# Patient Record
Sex: MALE | Race: WHITE | NOT HISPANIC OR LATINO | URBAN - METROPOLITAN AREA
[De-identification: names, ages, dates, MRNs, and addresses within clinical notes are randomized per-mention and may not be internally consistent; named-entity substitution may affect disease eponyms.]

---

## 2021-02-19 ENCOUNTER — EMERGENCY (EMERGENCY)
Facility: HOSPITAL | Age: 61
LOS: 1 days | Discharge: HOME | End: 2021-02-19
Attending: EMERGENCY MEDICINE
Payer: COMMERCIAL

## 2021-02-19 VITALS
RESPIRATION RATE: 17 BRPM | SYSTOLIC BLOOD PRESSURE: 144 MMHG | HEART RATE: 77 BPM | TEMPERATURE: 98 F | WEIGHT: 164.91 LBS | OXYGEN SATURATION: 100 % | DIASTOLIC BLOOD PRESSURE: 88 MMHG | HEIGHT: 68 IN

## 2021-02-19 VITALS
HEART RATE: 72 BPM | RESPIRATION RATE: 18 BRPM | SYSTOLIC BLOOD PRESSURE: 138 MMHG | DIASTOLIC BLOOD PRESSURE: 75 MMHG | OXYGEN SATURATION: 99 %

## 2021-02-19 PROCEDURE — 99283 EMERGENCY DEPT VISIT LOW MDM: CPT

## 2021-02-19 PROCEDURE — 73564 X-RAY EXAM KNEE 4 OR MORE: CPT

## 2021-02-19 PROCEDURE — 73564 X-RAY EXAM KNEE 4 OR MORE: CPT | Mod: 26,RT

## 2021-02-19 PROCEDURE — 99283 EMERGENCY DEPT VISIT LOW MDM: CPT | Mod: 25

## 2021-02-19 NOTE — ED PROVIDER NOTE - CLINICAL SUMMARY MEDICAL DECISION MAKING FREE TEXT BOX
Frannie: Patient previous cipro had minor twisting injury and had knee swell up. some small amount of pain. Knee is cool no redness. Not infectious. + tendon injury as most likely injury. fuid is in the joint. will xary and splint and refer. On asa. might hold asa for a day or 2.

## 2021-02-19 NOTE — ED PROVIDER NOTE - NSFOLLOWUPINSTRUCTIONS_ED_ALL_ED_FT
Rest and stay well hydrated.  No fracture or dislocation seen on x-ray today.   Follow-up with Orthopedic Surgery or Sports Medicine - you will likely need MRI to further evaluate your knee.   Wear knee immobilizer until seen by orthopedics.   Use crutches to walk, weight bear as tolerated.   Return to ED for any worsening pain, fever, redness, bleeding or any new concerning symptoms. Rest and stay well hydrated.  No fracture or dislocation seen on x-ray today-  you have a knee effusion (fluid in the joint space).   Follow-up with Orthopedic Surgery or Sports Medicine - you will likely need MRI to further evaluate your knee.   Wear knee immobilizer until seen by orthopedics.   Use crutches to walk, weight bear as tolerated.   Return to ED for any worsening pain, fever, redness, bleeding or any new concerning symptoms.

## 2021-02-19 NOTE — ED PROVIDER NOTE - PHYSICAL EXAMINATION
Gen: well developed male NAD   HEENT: NCAT, EOMI, no nasal discharge, mucous membranes moist  CV: RRR, +S1/S2, no M/R/G  Resp: CTAB, no W/R/R  GI: Abdomen soft non-distended, NTTP, no masses  MSK: R knee with edema, no warmth/erythema/bony tenderness or quad tenderness AROM intact but limited due to pain. distal pulses 2+ b/l and sensation intact/equal b/l  Neuro: A&Ox4, following commands, moving all four extremities spontaneously  Psych: appropriate mood

## 2021-02-19 NOTE — ED PROVIDER NOTE - NSFOLLOWUPCLINICS_GEN_ALL_ED_FT
An Orthopedic Surgeon  Orthopedic Surgery  .  [place]   Phone:   Fax:   Follow Up Time:     [facility] Orthopedic Surgery  Orthopedic Surgery  [address], 3rd & 4th [place] [address]  Phone: [phone]  Fax:   Follow Up Time:     [facility] - Primary Care  Primary Care  [address] [place] [address]  Phone: [phone]  Fax:   Follow Up Time:     [facility] Sports Medicine  Sports Medicine  [address] [place] [address]  Phone: [phone]  Fax:   Follow Up Time:

## 2021-02-19 NOTE — ED ADULT NURSE NOTE - OBJECTIVE STATEMENT
60 year old male presents to ED via wheel chair through waiting room from home complaining of atraumatic right knee pain and swelling. History of HTN, HLD. Finished three week course of cipro yesterday for "my prostate" as prescribed by his urologist. States he had no issues this morning when he woke up. 60 year old male presents to ED via wheel chair through waiting room from home complaining of atraumatic right knee pain and swelling. History of HTN, HLD. Finished three week course of cipro yesterday for "my prostate" as prescribed by his urologist. States he had no issues this morning when he woke up and then noted some discomfort in his knee, looked and noticed it being swollen. + swelling around knee, mostly above. Also endorsing discomfort behind kneecap. Denies any recent trauma, fall, injury or accident. Pain worse with movement causing difficulty walking due to the increased pain. Most comfortable at rest slightly bent. Placed in position of comfort with blanket under knee.

## 2021-02-19 NOTE — ED PROVIDER NOTE - CARE PROVIDER_API CALL
Pete So)  Orthopaedic Surgery; Surgery of the Hand  [address], [address]  [place] [address]  Phone: [phone]  Fax: [phone]  Follow Up Time: 1-3 Days

## 2021-02-19 NOTE — ED PROVIDER NOTE - OBJECTIVE STATEMENT
61 y/o M PMH aneurysm on full dose aspirin daily, HTN HLD presents to ED c/o atraumatic R knee pain onset this morning after he woke up. pt finished course of cipro for prostatitis yesterday 59 y/o M PMH aneurysm on full dose aspirin daily, HTN HLD presents to ED c/o R knee pain and swelling onset this morning after he woke up / twisted knee no falls or other trauma. pt finished course of cipro for prostatitis yesterday. denies f/c, redness. +has ambulated on knee but painful. denies pain or swelling in other joints. no hx knee surgery or problems in the past.

## 2021-02-19 NOTE — ED PROVIDER NOTE - PATIENT PORTAL LINK FT
You can access the FollowMyHealth Patient Portal offered by [facility] by registering at the following website: [email or web address]. By joining [facility]’s FollowMyHealth portal, you will also be able to view your health information using other applications (apps) compatible with our system.

## 2021-02-22 NOTE — ED PROCEDURE NOTE - ATTENDING CONTRIBUTION TO CARE
I Tony Kathleen MD discussed the procedure with the resident and or ACP and went over risks and benefits as well as indications for the procedure. I was present for key portions of the procedure itself and assisted as needed.

## 2021-11-19 ENCOUNTER — TRANSCRIPTION ENCOUNTER (OUTPATIENT)
Age: 61
End: 2021-11-19

## 2021-11-19 ENCOUNTER — APPOINTMENT (OUTPATIENT)
Dept: GASTROENTEROLOGY | Facility: CLINIC | Age: 61
End: 2021-11-19
Payer: COMMERCIAL

## 2021-11-19 VITALS
HEART RATE: 67 BPM | TEMPERATURE: 96.8 F | OXYGEN SATURATION: 99 % | DIASTOLIC BLOOD PRESSURE: 80 MMHG | SYSTOLIC BLOOD PRESSURE: 124 MMHG | BODY MASS INDEX: 25.01 KG/M2 | HEIGHT: 68 IN | WEIGHT: 165 LBS

## 2021-11-19 DIAGNOSIS — R19.4 CHANGE IN BOWEL HABIT: ICD-10-CM

## 2021-11-19 DIAGNOSIS — M25.00 HEMARTHROSIS, UNSPECIFIED JOINT: ICD-10-CM

## 2021-11-19 DIAGNOSIS — E78.5 HYPERLIPIDEMIA, UNSPECIFIED: ICD-10-CM

## 2021-11-19 DIAGNOSIS — I10 ESSENTIAL (PRIMARY) HYPERTENSION: ICD-10-CM

## 2021-11-19 PROBLEM — I72.9 ANEURYSM OF UNSPECIFIED SITE: Chronic | Status: ACTIVE | Noted: 2021-02-19

## 2021-11-19 PROCEDURE — 82270 OCCULT BLOOD FECES: CPT

## 2021-11-19 PROCEDURE — 99202 OFFICE O/P NEW SF 15 MIN: CPT

## 2021-11-19 RX ORDER — ROSUVASTATIN CALCIUM 5 MG/1
5 TABLET, FILM COATED ORAL
Refills: 0 | Status: ACTIVE | COMMUNITY

## 2021-11-19 RX ORDER — TADALAFIL 5 MG/1
5 TABLET, FILM COATED ORAL
Refills: 0 | Status: ACTIVE | COMMUNITY

## 2021-11-19 RX ORDER — AMLODIPINE BESYLATE 5 MG/1
5 TABLET ORAL
Refills: 0 | Status: ACTIVE | COMMUNITY

## 2021-11-19 RX ORDER — ASPIRIN 325 MG/1
325 TABLET, FILM COATED ORAL
Refills: 0 | Status: ACTIVE | COMMUNITY

## 2021-11-19 NOTE — REVIEW OF SYSTEMS
[As Noted in HPI] : as noted in HPI [Negative] : Respiratory [FreeTextEntry7] : Subtle change in bowel habits with softer stool. [FreeTextEntry9] : Acute swelling of right knee.

## 2021-11-19 NOTE — PHYSICAL EXAM
[General Appearance - Alert] : alert [General Appearance - In No Acute Distress] : in no acute distress [General Appearance - Well Nourished] : well nourished [Respiration, Rhythm And Depth] : normal respiratory rhythm and effort [Auscultation Breath Sounds / Voice Sounds] : lungs were clear to auscultation bilaterally [Apical Impulse] : the apical impulse was normal [Heart Rate And Rhythm] : heart rate was normal and rhythm regular [Heart Sounds] : normal S1 and S2 [Heart Sounds Gallop] : no gallops [Bowel Sounds] : normal bowel sounds [Abdomen Soft] : soft [Abdomen Tenderness] : non-tender [] : no hepato-splenomegaly [Normal Sphincter Tone] : normal sphincter tone [No Rectal Mass] : no rectal mass [Occult Blood Positive] : stool was negative for occult blood [FreeTextEntry1] : Swelling of the right knee visible through the patient's pants.

## 2021-11-19 NOTE — ASSESSMENT
[FreeTextEntry1] : The patient is a 60-year-old male with a history of hypertension and hyperlipidemia.  Patient has no chronic upper GI symptoms and most recently notices a subtle change in his bowel habits with mild increased intestinal gas and a softer stool.  The patient's stool is clearly guaiac negative and he is up-to-date on his colonoscopic examinations.  It is difficult to ascertain as to whether his GI symptoms have anything to do with his Covid booster but it does appear that he is having some mild colon spasm.  He does have an underlying redundant colon.  The patient was told to start a high potency probiotic decrease nonsoluble fiber and take soluble fiber supplements to increase bowel efficiency.  I am hesitant to give him any antispasmodic at the present time due to his prostate enlargement.  The patient will get back to me in 2 weeks time with a progress report.  If the symptoms persist we may consider imaging the abdomen.

## 2021-11-19 NOTE — HISTORY OF PRESENT ILLNESS
[FreeTextEntry1] : I saw your patient Tony Gao in the office today.  The patient is a 60-year-old male with a history of hypertension and hyperlipidemia.  He denies a history of diabetes or coronary artery disease and is on amlodipine and Crestor.  The patient has an enlarged prostate and currently takes Cialis.  The patient received a booster for Covid several weeks ago and thereafter states he noticed some mild change in his bowel habits.  He noticed a sensation of incomplete evacuation with mild increase in his intestinal gas as well as a looser stool.  There was no blood in the stool or on the toilet tissue and no nocturnal bowel movements.  The patient developed swelling of his right knee several days ago went to the emergency room he was found to have hemarthrosis of the right knee and may need further orthopedic intervention.  The patient denies a history of chronic reflux and his appetite is good with no dysphagia or unexplained weight loss.  The patient has had serial colonoscopies and his last was 2 years ago he has a redundant colon and colon spasm.  Patient states he eats a well-balanced diet.

## 2021-12-27 ENCOUNTER — APPOINTMENT (OUTPATIENT)
Dept: CT IMAGING | Facility: CLINIC | Age: 61
End: 2021-12-27

## 2021-12-27 ENCOUNTER — OUTPATIENT (OUTPATIENT)
Dept: OUTPATIENT SERVICES | Facility: HOSPITAL | Age: 61
LOS: 1 days | End: 2021-12-27
Payer: COMMERCIAL

## 2021-12-27 ENCOUNTER — RESULT REVIEW (OUTPATIENT)
Age: 61
End: 2021-12-27

## 2021-12-27 DIAGNOSIS — R19.4 CHANGE IN BOWEL HABIT: ICD-10-CM

## 2021-12-27 PROCEDURE — 82565 ASSAY OF CREATININE: CPT

## 2021-12-27 PROCEDURE — 74177 CT ABD & PELVIS W/CONTRAST: CPT

## 2021-12-27 PROCEDURE — 74177 CT ABD & PELVIS W/CONTRAST: CPT | Mod: 26

## 2022-03-07 ENCOUNTER — RX RENEWAL (OUTPATIENT)
Age: 62
End: 2022-03-07

## 2022-03-07 RX ORDER — LINACLOTIDE 72 UG/1
72 CAPSULE, GELATIN COATED ORAL
Qty: 30 | Refills: 5 | Status: ACTIVE | COMMUNITY
Start: 2022-01-10 | End: 1900-01-01

## 2023-02-04 NOTE — ED ADULT NURSE NOTE - NSSUHOSCREENINGYN_ED_ALL_ED
0700: report received  1200: removed saline lock, answered all questions, went over instructions, escorted out to private vehicle via wheelchair.   No - the patient is unable to be screened due to medical condition

## 2023-07-20 ENCOUNTER — NON-APPOINTMENT (OUTPATIENT)
Age: 63
End: 2023-07-20

## 2024-05-16 ENCOUNTER — NON-APPOINTMENT (OUTPATIENT)
Age: 64
End: 2024-05-16

## 2024-05-23 ENCOUNTER — NON-APPOINTMENT (OUTPATIENT)
Age: 64
End: 2024-05-23

## 2024-08-14 ENCOUNTER — NON-APPOINTMENT (OUTPATIENT)
Age: 64
End: 2024-08-14

## 2024-11-11 ENCOUNTER — APPOINTMENT (OUTPATIENT)
Dept: GASTROENTEROLOGY | Facility: CLINIC | Age: 64
End: 2024-11-11
Payer: COMMERCIAL

## 2024-11-11 VITALS
BODY MASS INDEX: 25.46 KG/M2 | TEMPERATURE: 97.1 F | OXYGEN SATURATION: 98 % | SYSTOLIC BLOOD PRESSURE: 120 MMHG | WEIGHT: 168 LBS | HEART RATE: 84 BPM | HEIGHT: 68 IN | DIASTOLIC BLOOD PRESSURE: 60 MMHG

## 2024-11-11 DIAGNOSIS — I10 ESSENTIAL (PRIMARY) HYPERTENSION: ICD-10-CM

## 2024-11-11 DIAGNOSIS — R19.4 CHANGE IN BOWEL HABIT: ICD-10-CM

## 2024-11-11 DIAGNOSIS — E78.5 HYPERLIPIDEMIA, UNSPECIFIED: ICD-10-CM

## 2024-11-11 DIAGNOSIS — Z86.0100 PERSONAL HISTORY OF COLON POLYPS, UNSPECIFIED: ICD-10-CM

## 2024-11-11 PROCEDURE — 99214 OFFICE O/P EST MOD 30 MIN: CPT

## 2024-11-11 RX ORDER — MULTIVITAMIN
TABLET ORAL
Refills: 0 | Status: ACTIVE | COMMUNITY

## 2024-11-11 RX ORDER — VIT A/VIT C/VIT E/ZINC/COPPER 4296-226
CAPSULE ORAL
Refills: 0 | Status: ACTIVE | COMMUNITY

## 2024-11-11 RX ORDER — DOXAZOSIN 8 MG/1
8 TABLET ORAL
Refills: 0 | Status: ACTIVE | COMMUNITY

## 2025-01-14 ENCOUNTER — APPOINTMENT (OUTPATIENT)
Dept: GASTROENTEROLOGY | Facility: AMBULATORY MEDICAL SERVICES | Age: 65
End: 2025-01-14
Payer: COMMERCIAL

## 2025-01-14 PROCEDURE — 45378 DIAGNOSTIC COLONOSCOPY: CPT | Mod: PT

## 2025-05-12 NOTE — ED PROCEDURE NOTE - NS ED PERI NEURO NEG
Oriented - self; Oriented - place; Oriented - time
Post-application: Motor, sensory, and vascular responses intact in the injured extremity.

## 2025-09-11 ENCOUNTER — NON-APPOINTMENT (OUTPATIENT)
Age: 65
End: 2025-09-11